# Patient Record
Sex: MALE | URBAN - METROPOLITAN AREA
[De-identification: names, ages, dates, MRNs, and addresses within clinical notes are randomized per-mention and may not be internally consistent; named-entity substitution may affect disease eponyms.]

---

## 2024-07-07 PROBLEM — 40739000: Status: ACTIVE | Noted: 2024-07-07

## 2024-07-09 ENCOUNTER — OFFICE VISIT (OUTPATIENT)
Dept: URBAN - METROPOLITAN AREA CLINIC 80 | Facility: CLINIC | Age: 39
End: 2024-07-09
Payer: COMMERCIAL

## 2024-07-09 ENCOUNTER — DASHBOARD ENCOUNTERS (OUTPATIENT)
Age: 39
End: 2024-07-09

## 2024-07-09 VITALS
BODY MASS INDEX: 34.69 KG/M2 | TEMPERATURE: 97.6 F | DIASTOLIC BLOOD PRESSURE: 79 MMHG | WEIGHT: 241.8 LBS | SYSTOLIC BLOOD PRESSURE: 132 MMHG | HEART RATE: 64 BPM

## 2024-07-09 DIAGNOSIS — R12 HEARTBURN: ICD-10-CM

## 2024-07-09 DIAGNOSIS — R19.4 CHANGE IN BOWEL HABIT: ICD-10-CM

## 2024-07-09 DIAGNOSIS — R13.19 CERVICAL DYSPHAGIA: ICD-10-CM

## 2024-07-09 DIAGNOSIS — R14.0 BLOATING: ICD-10-CM

## 2024-07-09 PROBLEM — 14760008: Status: ACTIVE | Noted: 2024-07-09

## 2024-07-09 PROCEDURE — 99204 OFFICE O/P NEW MOD 45 MIN: CPT

## 2024-07-09 RX ORDER — FLUTICASONE PROPIONATE 50 UG/1
SPRAY 1 SPRAY BY NASAL ROUTE EVERY DAY SPRAY, METERED NASAL
Qty: 16 GRAM | Refills: 0 | Status: ACTIVE | COMMUNITY

## 2024-07-09 RX ORDER — PANTOPRAZOLE SODIUM 40 MG/1
1 TABLET TABLET, DELAYED RELEASE ORAL ONCE A DAY
Qty: 90 TABLET | Refills: 3 | OUTPATIENT
Start: 2024-07-09

## 2024-07-09 RX ORDER — TADALAFIL 20 MG/1
TAKE ONE-HALF TABLET BY MOUTH ONCE A DAY TABLET, FILM COATED ORAL
Qty: 10 UNSPECIFIED | Refills: 0 | Status: ACTIVE | COMMUNITY

## 2024-07-09 RX ORDER — EMTRICITABINE AND TENOFOVIR ALAFENAMIDE 200; 25 MG/1; MG/1
1 TABLET TABLET ORAL ONCE A DAY
Status: ACTIVE | COMMUNITY

## 2024-07-09 RX ORDER — HYDROCHLOROTHIAZIDE 12.5 MG/1
TABLET ORAL
Qty: 30 TABLET | Status: ACTIVE | COMMUNITY

## 2024-07-09 RX ORDER — CETIRIZINE HYDROCHLORIDE 10 MG/1
TAKE 1 TABLET BY MOUTH EVERY DAY TABLET ORAL
Qty: 30 EACH | Refills: 0 | Status: ACTIVE | COMMUNITY

## 2024-07-09 NOTE — HPI-TODAY'S VISIT:
Pt is a 37 y/o male who presents today c/o trouble swallowing.    Pt was seen at the ER on 7/2/2024 c/o chest pain. Labs at the ED including BMP and CBC were unremarkable. A troponin x 2 was completed which was negative. CXR with no acute disease of the lungs. No ST changes noted by ER MD on EKG. Pt reported to ER staff that he completed a stress test last April with no interventions or further testing. Pt was discharged and advised to f/u with cardiology and GI, as he was having difficulty swallowing. [facility] ER referred pt to Dr. Prakash.  Pt notes a history of Ventricular hypertrophy diaonised a few years ago. At that time pt's sympoms were squeezing of the chest. pt notes that he is having no current pain, Most recent epidoes of pain were described as chest tightness and occured the day pt went to the ER and the day after. Following up with cardology next month. pt was given nitro at the ER that helped with pain. strict ER precautions discussed with pt today.  At the same time as pt was having this episode of chest tightness he was also having diffuclty swallowing.This senstion is described as sharpness over the esophagus. pt can "feel it" when he swallows both solids and liquids x 2 months. This sharpness can be at random times of when pt is eating or drinking.   +heartburn x 1 month, pt notes he is not currently taking anything for this symptom  increased mucus in stool x 2-3 months  +abominal bloating x 1-2 months worse throughout the day and worse after meals   denies N/V, fever/chills, denies family history of GI related cancer, no unintentional weight loss, no melena, no BRBPR, denies abdominal pain  denies loss of appetite  pt has never had a colon or EGD   Patinet denies blood thinner use, pacemaker/defibrillator, diabetes, kidney disease, and home O2.  Pt also reports a change with bowel habits, normal bowel habit is 1 BM a day, now pt is going once a day but its hard to push out.  off and on constipation x several months pt reports dirrhea a couple of weeks ago, bss 6 pt was having 2-3 BM a day x 2 days. denies fever/chills, sick contacts or recent travel.

## 2024-07-10 LAB
A/G RATIO: 1.6
ALBUMIN: 4.4
ALKALINE PHOSPHATASE: 38
ALT (SGPT): 16
AST (SGOT): 14
BILIRUBIN, TOTAL: 0.6
BUN/CREATININE RATIO: (no result)
BUN: 17
C-REACTIVE PROTEIN, QUANT: 4
CALCIUM: 9.5
CARBON DIOXIDE, TOTAL: 26
CHLORIDE: 104
CREATININE: 1
EGFR: 99
GLOBULIN, TOTAL: 2.8
GLUCOSE: 89
IMMUNOGLOBULIN A, QN, SERUM: 338
INTERPRETATION: (no result)
LIPASE: 10
POTASSIUM: 4.7
PROTEIN, TOTAL: 7.2
SODIUM: 138
T-TRANSGLUTAMINASE (TTG) IGA: <1